# Patient Record
Sex: FEMALE | Race: WHITE | URBAN - METROPOLITAN AREA
[De-identification: names, ages, dates, MRNs, and addresses within clinical notes are randomized per-mention and may not be internally consistent; named-entity substitution may affect disease eponyms.]

---

## 2017-02-14 ENCOUNTER — APPOINTMENT (OUTPATIENT)
Age: 22
Setting detail: DERMATOLOGY
End: 2017-02-14

## 2017-02-14 DIAGNOSIS — L70.0 ACNE VULGARIS: ICD-10-CM

## 2017-02-14 DIAGNOSIS — L81.0 POSTINFLAMMATORY HYPERPIGMENTATION: ICD-10-CM

## 2017-02-14 PROCEDURE — OTHER COUNSELING: OTHER

## 2017-02-14 PROCEDURE — OTHER TREATMENT REGIMEN: OTHER

## 2017-02-14 PROCEDURE — OTHER PRESCRIPTION: OTHER

## 2017-02-14 PROCEDURE — 99213 OFFICE O/P EST LOW 20 MIN: CPT

## 2017-02-14 RX ORDER — DOXYCYCLINE 100 MG/1
CAPSULE ORAL
Qty: 60 | Refills: 3 | Status: ERX

## 2017-02-14 ASSESSMENT — LOCATION ZONE DERM: LOCATION ZONE: FACE

## 2017-02-14 ASSESSMENT — LOCATION SIMPLE DESCRIPTION DERM
LOCATION SIMPLE: RIGHT CHEEK
LOCATION SIMPLE: LEFT CHEEK

## 2017-02-14 ASSESSMENT — LOCATION DETAILED DESCRIPTION DERM
LOCATION DETAILED: LEFT INFERIOR CENTRAL MALAR CHEEK
LOCATION DETAILED: RIGHT INFERIOR CENTRAL MALAR CHEEK

## 2017-02-14 NOTE — PROCEDURE: TREATMENT REGIMEN
Continue Regimen: Finacea twice daily
Detail Level: Zone
Continue Regimen: Tazorac cream 0.05% every other day
Plan: Advised patient she can try to take Doxycyline one time a day however the Rx was written for BID in case she has to go back to taking it BID
Plan: Wear a sunscreen daily